# Patient Record
Sex: MALE | Race: WHITE | NOT HISPANIC OR LATINO | Employment: FULL TIME | URBAN - METROPOLITAN AREA
[De-identification: names, ages, dates, MRNs, and addresses within clinical notes are randomized per-mention and may not be internally consistent; named-entity substitution may affect disease eponyms.]

---

## 2017-06-09 DIAGNOSIS — K02.61 DENTAL CARIES ON SMOOTH SURFACE LIMITED TO ENAMEL: ICD-10-CM

## 2021-06-16 ENCOUNTER — OFFICE VISIT (OUTPATIENT)
Dept: DENTISTRY | Facility: CLINIC | Age: 31
End: 2021-06-16

## 2021-06-16 VITALS — SYSTOLIC BLOOD PRESSURE: 131 MMHG | TEMPERATURE: 96.9 F | DIASTOLIC BLOOD PRESSURE: 81 MMHG | HEART RATE: 60 BPM

## 2021-06-16 DIAGNOSIS — K02.61 DENTAL CARIES ON SMOOTH SURFACE LIMITED TO ENAMEL: Primary | ICD-10-CM

## 2021-06-16 PROCEDURE — D2392 RESIN-BASED COMPOSITE - 2 SURFACES, POSTERIOR: HCPCS | Performed by: DENTIST

## 2021-06-16 NOTE — PROGRESS NOTES
Dental procedures in this visit     - RESIN-BASED COMPOSITE - 2 SURFACES, POSTERIOR 28 DO (Completed)     Service provider: Servando Napier DDS     Billing provider: Mali Whitmore DMD      Pt presented to clinic for restorative  Reviewed med hx  Pt denies any changes  ASA I  Applied topical benzocaine 20%  Administered 0 5 carpules of 4% septocaine 1 7 ml with 1:100k epi to right mental nerve  Placed bite block and removed primary caries from DO of #28  Mesial of #29 did not appear cavitated, #29 needs a crown regardless  Placed thacker matrix, wedge, and ring  Used Viscostat for heme control  Placed etch/primer/adhesive  Filled with A2 flowable composite and packable  Checked contact, margins, and occlusion  Pt satisfied with treatment      NV: Continue restorative    Dr Rocío Sparks

## 2021-10-21 ENCOUNTER — OFFICE VISIT (OUTPATIENT)
Dept: DENTISTRY | Facility: CLINIC | Age: 31
End: 2021-10-21

## 2021-10-21 ENCOUNTER — OFFICE VISIT (OUTPATIENT)
Dept: FAMILY MEDICINE CLINIC | Facility: CLINIC | Age: 31
End: 2021-10-21

## 2021-10-21 VITALS
HEIGHT: 74 IN | RESPIRATION RATE: 16 BRPM | OXYGEN SATURATION: 99 % | SYSTOLIC BLOOD PRESSURE: 150 MMHG | DIASTOLIC BLOOD PRESSURE: 100 MMHG | BODY MASS INDEX: 21.17 KG/M2 | HEART RATE: 101 BPM | TEMPERATURE: 98 F | WEIGHT: 165 LBS

## 2021-10-21 VITALS — DIASTOLIC BLOOD PRESSURE: 96 MMHG | HEART RATE: 102 BPM | SYSTOLIC BLOOD PRESSURE: 147 MMHG | TEMPERATURE: 97.5 F

## 2021-10-21 DIAGNOSIS — M54.2 NECK PAIN: Primary | ICD-10-CM

## 2021-10-21 DIAGNOSIS — Z01.21 ENCOUNTER FOR DENTAL EXAMINATION AND CLEANING WITH ABNORMAL FINDINGS: Primary | ICD-10-CM

## 2021-10-21 PROCEDURE — D0191 ASSESSMENT OF A PATIENT: HCPCS | Performed by: DENTIST

## 2021-10-21 PROCEDURE — 99214 OFFICE O/P EST MOD 30 MIN: CPT | Performed by: FAMILY MEDICINE

## 2021-11-04 ENCOUNTER — OFFICE VISIT (OUTPATIENT)
Dept: DENTISTRY | Facility: CLINIC | Age: 31
End: 2021-11-04

## 2021-11-04 VITALS — SYSTOLIC BLOOD PRESSURE: 135 MMHG | HEART RATE: 80 BPM | TEMPERATURE: 97.3 F | DIASTOLIC BLOOD PRESSURE: 84 MMHG

## 2021-11-04 DIAGNOSIS — Z01.20 ENCOUNTER FOR DENTAL EXAMINATION: Primary | ICD-10-CM

## 2021-11-04 PROCEDURE — D1110 PROPHYLAXIS - ADULT: HCPCS | Performed by: DENTAL HYGIENIST

## 2021-11-04 PROCEDURE — D1330 ORAL HYGIENE INSTRUCTIONS: HCPCS | Performed by: DENTAL HYGIENIST

## 2021-11-04 PROCEDURE — D0274 BITEWINGS - 4 RADIOGRAPHIC IMAGES: HCPCS | Performed by: DENTAL HYGIENIST

## 2021-11-04 PROCEDURE — D0120 PERIODIC ORAL EVALUATION - ESTABLISHED PATIENT: HCPCS | Performed by: DENTIST

## 2021-11-11 ENCOUNTER — OFFICE VISIT (OUTPATIENT)
Dept: DENTISTRY | Facility: CLINIC | Age: 31
End: 2021-11-11

## 2021-11-11 VITALS — HEART RATE: 103 BPM | TEMPERATURE: 97.5 F | SYSTOLIC BLOOD PRESSURE: 142 MMHG | DIASTOLIC BLOOD PRESSURE: 94 MMHG

## 2021-11-11 DIAGNOSIS — K08.50 DEFECTIVE DENTAL RESTORATION: Primary | ICD-10-CM

## 2021-11-11 PROCEDURE — D2392 RESIN-BASED COMPOSITE - 2 SURFACES, POSTERIOR: HCPCS | Performed by: DENTIST

## 2021-11-23 ENCOUNTER — OFFICE VISIT (OUTPATIENT)
Dept: FAMILY MEDICINE CLINIC | Facility: CLINIC | Age: 31
End: 2021-11-23

## 2021-11-23 VITALS
DIASTOLIC BLOOD PRESSURE: 92 MMHG | HEIGHT: 74 IN | OXYGEN SATURATION: 96 % | HEART RATE: 99 BPM | WEIGHT: 207.3 LBS | BODY MASS INDEX: 26.6 KG/M2 | SYSTOLIC BLOOD PRESSURE: 138 MMHG | TEMPERATURE: 97.8 F | RESPIRATION RATE: 18 BRPM

## 2021-11-23 DIAGNOSIS — Z11.4 SCREENING FOR HIV (HUMAN IMMUNODEFICIENCY VIRUS): ICD-10-CM

## 2021-11-23 DIAGNOSIS — Z13.9 ENCOUNTER FOR HEALTH-RELATED SCREENING: ICD-10-CM

## 2021-11-23 DIAGNOSIS — Z11.59 NEED FOR HEPATITIS C SCREENING TEST: Primary | ICD-10-CM

## 2021-11-23 PROBLEM — F10.10 ALCOHOL ABUSE: Status: ACTIVE | Noted: 2021-11-23

## 2021-11-23 PROBLEM — F41.1 GAD (GENERALIZED ANXIETY DISORDER): Status: ACTIVE | Noted: 2021-11-23

## 2021-11-23 PROCEDURE — 99214 OFFICE O/P EST MOD 30 MIN: CPT | Performed by: FAMILY MEDICINE

## 2021-11-29 ENCOUNTER — APPOINTMENT (OUTPATIENT)
Dept: LAB | Facility: HOSPITAL | Age: 31
End: 2021-11-29

## 2021-12-21 ENCOUNTER — OFFICE VISIT (OUTPATIENT)
Dept: FAMILY MEDICINE CLINIC | Facility: CLINIC | Age: 31
End: 2021-12-21

## 2021-12-21 VITALS
TEMPERATURE: 97.6 F | RESPIRATION RATE: 18 BRPM | HEART RATE: 98 BPM | SYSTOLIC BLOOD PRESSURE: 134 MMHG | BODY MASS INDEX: 27.44 KG/M2 | DIASTOLIC BLOOD PRESSURE: 86 MMHG | HEIGHT: 74 IN | WEIGHT: 213.8 LBS | OXYGEN SATURATION: 99 %

## 2021-12-21 DIAGNOSIS — F10.10 ALCOHOL ABUSE: Primary | ICD-10-CM

## 2021-12-21 DIAGNOSIS — F41.1 GAD (GENERALIZED ANXIETY DISORDER): ICD-10-CM

## 2021-12-21 PROCEDURE — 99213 OFFICE O/P EST LOW 20 MIN: CPT | Performed by: INTERNAL MEDICINE

## 2022-03-15 ENCOUNTER — OFFICE VISIT (OUTPATIENT)
Dept: DENTISTRY | Facility: CLINIC | Age: 32
End: 2022-03-15

## 2022-03-15 VITALS — SYSTOLIC BLOOD PRESSURE: 134 MMHG | DIASTOLIC BLOOD PRESSURE: 84 MMHG | TEMPERATURE: 98 F

## 2022-03-15 DIAGNOSIS — Z98.890 PREVIOUSLY INITIATED ENDODONTIC THERAPY COMPLETED: Primary | ICD-10-CM

## 2022-03-15 PROCEDURE — D2954 PREFABRICATED POST AND CORE IN ADDITION TO CROWN: HCPCS | Performed by: DENTIST

## 2022-03-16 NOTE — PROGRESS NOTES
Pt presents to clinic for post/core #29  Medical history reviewed, no changes  Topical benzocaine applied, administered 1/2 carp of 4% articaine w/ 1:100K epi via local infiltration  Clamp placed on tooth with floss ligature and rubber dam isolation utilized  Removed old core material using a round carbide on HS, and canals located  Created post space using System B and para post drills up to the red drill  Tried in corresponding post, confirmed post went to length  Etched tooth and post space, rinsed/dried  Applied adhesive, gentle air dry, light cure  Placed Multicore into post space and on post, placed post in post space and light cured  Restored the tooth with Multicore  Refined with finishing burs, verified occlusion  Post Op PA radiograph taken  Pt left satisfied and ambulatory  Nv: [place] prep #29  Ww: Dr Candy Scott    Pt has severe gag reflex to anything touching his tongue  Talked about the need for crowns on teeth #4 and 5 due to previously endo treated #4 and large restoration #5, as well as the larg interproximal space between the teeth  Explained to pt it would be best to fabricate all crowns together, but he prefers to do one at a time

## 2022-11-25 ENCOUNTER — OFFICE VISIT (OUTPATIENT)
Dept: DENTISTRY | Facility: CLINIC | Age: 32
End: 2022-11-25

## 2022-11-25 VITALS — SYSTOLIC BLOOD PRESSURE: 129 MMHG | HEART RATE: 90 BPM | TEMPERATURE: 98.2 F | DIASTOLIC BLOOD PRESSURE: 84 MMHG

## 2022-11-25 DIAGNOSIS — Z01.21 ENCOUNTER FOR DENTAL EXAMINATION AND CLEANING WITH ABNORMAL FINDINGS: Primary | ICD-10-CM

## 2022-11-25 NOTE — PROGRESS NOTES
Periodic Exam    Barbara Alvarado presents for a perioditc exam  Verbal consent for treatment given in addition to the forms  Reviewed health history - Patient is ASA class 1  No medications taken  Consents signed: Yes    Perio: PPD range from 2-4  Isolated areas with 5  Gingivitis  Pain Scale: 0  Caries Assessment: High  Radiographs: 4 Bitewings    Patient has a severe gag reflex  Had patient swish with Listerine before impressions  Upper and lower alginate impressions were taken for diagnostic casts  Findings  5-D open margin, 14-D watch, 15-M caries, 17-MOB caries, 18-D caries, 21 incipient, 29 L- caries   Watch 13 M and D  18 and 17 are mesially shifted      Tx plan: Prophy every 6 months  Prevident prescribed for caries  Prevident prescription given to patient via paper form  Restorative Tx Plan in this order  #5DO (WIS CODE),  #29 [place] prep   #15 [place] prep and verify #14DO  #18 [place] and verify extent of #17 MOB   #4 crow    Pt is interested in restoring missing teeth with implants  Prognosis is Good  Referrals needed: No  Nv: Prophy  Nnv:  #5DO re-do    All crowns should be scheduled with Dr Yazmin Brantley

## 2022-11-25 NOTE — DENTAL PROCEDURE DETAILS
Periodic Exam    Kem Mcdaniel presents for a perioditc exam  Verbal consent for treatment given in addition to the forms  Reviewed health history - Patient is ASA class 1  No medications taken  Consents signed: Yes    Perio: PPD range from 2-4  Isolated areas with 5  Gingivitis  Pain Scale: 0  Caries Assessment: High  Radiographs: 4 Bitewings    Patient has a severe gag reflex  Had patient swish with Listerine before impressions  Upper and lower alginate impressions were taken for diagnostic casts  Findings  5-D open margin, 14-D watch, 15-M caries, 17-MOB caries, 18-D caries, 21 incipient, 29 L- caries   Watch 13 M and D  18 and 17 are mesially shifted      Tx plan: Prophy every 6 months  Prevident prescribed for caries  Prevident prescription given to patient via paper form  Restorative Tx Plan in this order  #5DO (WIS CODE),  #29 [place] prep   #15 [place] prep and verify #14DO  #18 [place] and verify extent of #17 MOB   #4 crow    Pt is interested in restoring missing teeth with implants  Prognosis is Good  Referrals needed: No  Nv: Prophy  Nnv:  #5DO re-do    All crowns should be scheduled with Dr Gloria Ruiz

## 2023-03-08 ENCOUNTER — OFFICE VISIT (OUTPATIENT)
Dept: DENTISTRY | Facility: CLINIC | Age: 33
End: 2023-03-08

## 2023-03-08 ENCOUNTER — OFFICE VISIT (OUTPATIENT)
Dept: FAMILY MEDICINE CLINIC | Facility: CLINIC | Age: 33
End: 2023-03-08

## 2023-03-08 VITALS — TEMPERATURE: 98.3 F | SYSTOLIC BLOOD PRESSURE: 136 MMHG | HEART RATE: 89 BPM | DIASTOLIC BLOOD PRESSURE: 94 MMHG

## 2023-03-08 VITALS
OXYGEN SATURATION: 98 % | WEIGHT: 221.7 LBS | TEMPERATURE: 98 F | BODY MASS INDEX: 28.45 KG/M2 | SYSTOLIC BLOOD PRESSURE: 126 MMHG | RESPIRATION RATE: 18 BRPM | DIASTOLIC BLOOD PRESSURE: 98 MMHG | HEIGHT: 74 IN | HEART RATE: 107 BPM

## 2023-03-08 DIAGNOSIS — R68.84 JAW PAIN: Primary | ICD-10-CM

## 2023-03-08 DIAGNOSIS — Z01.20 ENCOUNTER FOR DENTAL EXAMINATION: Primary | ICD-10-CM

## 2023-03-08 DIAGNOSIS — K09.0: Primary | ICD-10-CM

## 2023-03-08 RX ORDER — GABAPENTIN 300 MG/1
300 CAPSULE ORAL 3 TIMES DAILY
Qty: 60 CAPSULE | Refills: 3 | Status: SHIPPED | OUTPATIENT
Start: 2023-03-08

## 2023-03-08 NOTE — DENTAL PROCEDURE DETAILS
ASA II  Pain - 0  Reviewed M/DH    Prophylaxis completed with ultrasonic  and hand instrumentation  Soft plaque removed and supragingival calculus removed from all teeth  Polished with prophy cup and paste  Flossed and provided Oral Health Instructions  Demonstrated proper brushing and flossing technique  Patient left satisfied and ambulatory      Exam:  none  Referral:  none    NV1:  Restorative  NV2:  6mrc

## 2023-03-08 NOTE — PROGRESS NOTES
CC- Pt c/o R jaw pain radiating to his chest; happens at work or lying down sometimes per pt report  ASA II  NOTE: h/o tob use; states marijuana sometimes  Panorex: no PAP; no cysts noted; no abnoraml findings specifically RLQ  NO TX TODAY DUE TO CC;pt in no acute distress and no difficulty breathing at this appt but will defer tx today  Exam: no soft tissue swelling not intraorally; teeth removed LRQ and appear to have completely healed LRQ; #29 previous rct/crown no PAP  No tenderness noted right side of face extraorally/intraorally  Referral to PCP today ; last visit approx I yr  Discussed with pt referral and why to r/o any cardiac issues  Pt is understanding and will go to make appt at [facility]    NV: #5 DO

## 2023-03-08 NOTE — LETTER
March 10, 2023     MD Radha [address]  [id number]    Patient: Valeriano Whittington   YOB: 1990   Date of Visit: 3/8/2023       Dear Dr Maulik Burton:    Thank you for referring Valeriano Whittington to me for evaluation  Below are my notes for this consultation  If you have questions, please do not hesitate to call me  I look forward to following your patient along with you  Sincerely,        Severino Jerome DO        CC: No Recipients  Severino Jerome DO  3/10/2023 10:11 AM  Incomplete  Assessment/Plan:    1  Jaw pain  Assessment & Plan:  Likely neuropathic in etiology  Less likely secondary to any cardiac etiology  Cardiac exam unremarkable  Currently denies any chest pain  No work-up for cardio recommended at this time  Will recommend patient continues trial of gabapentin when these episodes do occur  Encourage patient to keep journal of events and follow-up in a few months to go over her symptoms and resolution of symptoms with gabapentin as needed  Orders:  -     gabapentin (Neurontin) 300 mg capsule; Take 1 capsule (300 mg total) by mouth 3 (three) times a day       Subjective:     Patient ID: Valeriano Whittington is a 28 y o  male  History notable for general anxiety disorder, alcohol abuse is coming in today after visit to the dentist where he reported ongoing jaw pain that is been going on for several months  Dentist was concerned of this jaw pain and wanted to refer to PCP to evaluate for cardiac etiology  Patient reports that his pain is usually around his jaw and has mild radiation down his jaw, however he denies any chest pain, palpitation or any shortness of breath  Patient denies any trauma to the jaw  He states that these episodes last for 30 minutes and subsequently go away  Patient denies any family history of any heart disease  Patient denies any history of any cardiovascular disease        The following portions of the patient's history were reviewed and updated as appropriate: allergies, current medications, past family history, past medical history, past social history, past surgical history, and problem list     Review of Systems   Constitutional: Negative for chills and fever  HENT: Negative for ear pain and sore throat  Eyes: Negative for pain and visual disturbance  Respiratory: Negative for cough and shortness of breath  Cardiovascular: Negative for chest pain and palpitations  Gastrointestinal: Negative for abdominal pain and vomiting  Genitourinary: Negative for dysuria and hematuria  Musculoskeletal: Negative for arthralgias and back pain  Skin: Negative for color change and rash  Neurological: Negative for seizures and syncope  All other systems reviewed and are negative  Objective:      /98 (BP Location: Left arm, Patient Position: Sitting, Cuff Size: Standard)   Pulse (!) 107   Temp 98 °F (36 7 °C) (Temporal)   Resp 18   Ht 6' 2" (1 88 m)   Wt 101 kg (221 lb 11 2 oz)   SpO2 98%   BMI 28 46 kg/m²         Physical Exam  Constitutional:       General: He is not in acute distress  Appearance: Normal appearance  He is not toxic-appearing or diaphoretic  HENT:      Head: Normocephalic  Mouth/Throat:      Mouth: Mucous membranes are moist    Eyes:      Extraocular Movements: Extraocular movements intact  Pupils: Pupils are equal, round, and reactive to light  Cardiovascular:      Rate and Rhythm: Normal rate and regular rhythm  Pulmonary:      Effort: Pulmonary effort is normal  No tachypnea  Breath sounds: Normal breath sounds  No wheezing or rales  Chest:      Chest wall: No edema  There is no dullness to percussion  Abdominal:      General: Abdomen is flat  There is no distension  Palpations: Abdomen is soft  Tenderness: There is no abdominal tenderness  Musculoskeletal:      Right lower leg: No edema  Left lower leg: No edema     Skin: Capillary Refill: Capillary refill takes less than 2 seconds  Neurological:      General: No focal deficit present  Mental Status: He is alert and oriented to person, place, and time     Psychiatric:         Mood and Affect: Mood normal          Behavior: Behavior normal           Marysol Goncalves DO   Family Medicine PGY-2  3/10/2023

## 2023-03-08 NOTE — DENTAL PROCEDURE DETAILS
ASA  I  Pain- 0  Reviewed M/DH    Prophylaxis completed with ultrasonic  and hand instrumentation  Soft plaque removed and supragingival calculus removed from all teeth  Polished with prophy cup and paste  Flossed and provided Oral Health Instructions  Demonstrated proper brushing and flossing technique  Patient left satisfied and ambulatory  Exam:  none  Referral:  none    NV1:  Redo rest #5  NV2:  [place] prep #14?   NV3:  6mrc

## 2023-03-10 PROBLEM — R68.84 JAW PAIN: Status: ACTIVE | Noted: 2023-03-10

## 2023-03-10 NOTE — PROGRESS NOTES
Assessment/Plan:    1  Jaw pain  Assessment & Plan:  Likely neuropathic in etiology  Less likely secondary to any cardiac etiology  Cardiac exam unremarkable  Currently denies any chest pain  No work-up for cardio recommended at this time  Will recommend patient continues trial of gabapentin when these episodes do occur  Encourage patient to keep journal of events and follow-up in a few months to go over her symptoms and resolution of symptoms with gabapentin as needed  Orders:  -     gabapentin (Neurontin) 300 mg capsule; Take 1 capsule (300 mg total) by mouth 3 (three) times a day       Subjective:      Patient ID: Zoë Alas is a 28 y o  male  History notable for general anxiety disorder, alcohol abuse is coming in today after visit to the dentist where he reported ongoing jaw pain that is been going on for several months  Dentist was concerned of this jaw pain and wanted to refer to PCP to evaluate for cardiac etiology  Patient reports that his pain is usually around his jaw and has mild radiation down his jaw, however he denies any chest pain, palpitation or any shortness of breath  Patient denies any trauma to the jaw  He states that these episodes last for 30 minutes and subsequently go away  Patient denies any family history of any heart disease  Patient denies any history of any cardiovascular disease  The following portions of the patient's history were reviewed and updated as appropriate: allergies, current medications, past family history, past medical history, past social history, past surgical history, and problem list     Review of Systems   Constitutional: Negative for chills and fever  HENT: Negative for ear pain and sore throat  Eyes: Negative for pain and visual disturbance  Respiratory: Negative for cough and shortness of breath  Cardiovascular: Negative for chest pain and palpitations  Gastrointestinal: Negative for abdominal pain and vomiting  Genitourinary: Negative for dysuria and hematuria  Musculoskeletal: Negative for arthralgias and back pain  Skin: Negative for color change and rash  Neurological: Negative for seizures and syncope  All other systems reviewed and are negative  Objective:      /98 (BP Location: Left arm, Patient Position: Sitting, Cuff Size: Standard)   Pulse (!) 107   Temp 98 °F (36 7 °C) (Temporal)   Resp 18   Ht 6' 2" (1 88 m)   Wt 101 kg (221 lb 11 2 oz)   SpO2 98%   BMI 28 46 kg/m²          Physical Exam  Constitutional:       General: He is not in acute distress  Appearance: Normal appearance  He is not toxic-appearing or diaphoretic  HENT:      Head: Normocephalic  Mouth/Throat:      Mouth: Mucous membranes are moist    Eyes:      Extraocular Movements: Extraocular movements intact  Pupils: Pupils are equal, round, and reactive to light  Cardiovascular:      Rate and Rhythm: Normal rate and regular rhythm  Pulmonary:      Effort: Pulmonary effort is normal  No tachypnea  Breath sounds: Normal breath sounds  No wheezing or rales  Chest:      Chest wall: No edema  There is no dullness to percussion  Abdominal:      General: Abdomen is flat  There is no distension  Palpations: Abdomen is soft  Tenderness: There is no abdominal tenderness  Musculoskeletal:      Right lower leg: No edema  Left lower leg: No edema  Skin:     Capillary Refill: Capillary refill takes less than 2 seconds  Neurological:      General: No focal deficit present  Mental Status: He is alert and oriented to person, place, and time     Psychiatric:         Mood and Affect: Mood normal          Behavior: Behavior normal            Lars Schafer DO   Family Medicine PGY-2  3/10/2023

## 2023-03-10 NOTE — ASSESSMENT & PLAN NOTE
Likely neuropathic in etiology  Less likely secondary to any cardiac etiology  Cardiac exam unremarkable  Currently denies any chest pain  No work-up for cardio recommended at this time  Will recommend patient continues trial of gabapentin when these episodes do occur  Encourage patient to keep journal of events and follow-up in a few months to go over her symptoms and resolution of symptoms with gabapentin as needed

## 2023-04-04 ENCOUNTER — OFFICE VISIT (OUTPATIENT)
Dept: DENTISTRY | Facility: CLINIC | Age: 33
End: 2023-04-04

## 2023-04-04 VITALS — DIASTOLIC BLOOD PRESSURE: 80 MMHG | HEART RATE: 89 BPM | TEMPERATURE: 98.2 F | SYSTOLIC BLOOD PRESSURE: 119 MMHG

## 2023-04-04 DIAGNOSIS — K08.59 FULL RESTORATION OF CROWN OF TOOTH NEEDED DUE TO PREVIOUS ENDODONTIC TREATMENT: Primary | ICD-10-CM

## 2023-04-04 NOTE — PROGRESS NOTES
[place] Prep: 29 PFZ     Sabrina Callahan presents for crown prep #29  PMH reviewed, no changes  ASA Type 1  Pt states he does not take any medications including gabapentin  Discussed doing a lower partial to replace missing teeth  Pt does not want anything removable  Pt also has strong gag refex  Discussed mandibular implants and patient was on board  Discussed case with Dr Heriberto Pascal and he said one implant can be place at edentulous site #30  Fabricated bite matrix with PVS on dx  Cast     Shade C3  Approved with pt mirror  Applied topical benzocaine, administered 0 25 carpule of 4% articaine 1:100k epi via local infiltration of 29  Tooth prepped with reductions for #29 PFZ  Made provisional crown with provisa and matrix, refined with lizandro on high speed  Confirmed provisional covers margins with no overhangs  Packed size 00 cord soaked in hemodent  Packed size 0 second cord  Removed second cord, air dried  Impression made with Delkit light and heavy body using triple tray  Impression removed after 5 min, confirmed preparation captured with no voids or distortions  Cemented provisional crown using Provicell  Removed excess cement, occlusion and contacts verified  Selected porcelain shade C3, pt confirmed with mirror  Pt left satisfied and ambulatory  ? Re-discuss implant idea for #30  Dr Heriberto Pascal looked at case and verified there is enough bone for one implant  He said no CBCT is needed for this case      NV: delivery of PFZ crown #29  Nnv: #30 implant placement (discuss with pt first)

## 2023-05-08 ENCOUNTER — OFFICE VISIT (OUTPATIENT)
Dept: DENTISTRY | Facility: CLINIC | Age: 33
End: 2023-05-08

## 2023-05-08 VITALS — SYSTOLIC BLOOD PRESSURE: 149 MMHG | HEART RATE: 90 BPM | DIASTOLIC BLOOD PRESSURE: 97 MMHG

## 2023-05-08 DIAGNOSIS — Z78.9 FULL COVERAGE CROWN NEEDED FOR ROOT CANAL-TREATED TOOTH: Primary | ICD-10-CM

## 2023-05-08 NOTE — PROGRESS NOTES
[place] Delivery #29 PFZ    Mau Fall presents for delivery of #29 PFZ crown delivery  PMH reviewed, no changes  Pt reports no pain or sensitivity from tooth since last visit  ASA 1, pain 0  Pt opted for no anesthesia during procedure  Provisional crown removed, cement removed from prep with   Cleaned prep with prophy cup and pumice on slow speed  [place] tried on die, confirmed accurate seating, margin will adapted and sealed  [place] tried intraorally, crown contacts were tight, adjusted it with high speed hand piece  Crown seating fully with floss snapping through contacts  Verified seating with radiograph  Occlusion verified and no adjustments were necessary  Pt confirmed satisfaction with shade via pt mirror  Cleaned internal of crown with ivoclean  Rinse of prep and air dry, isolated with cotton rolls  Cemented with Calibra, tack cured, excess cement removed with high speed suction  After 3 min, floss through contacts to remove interproximal cement  Remaining cement removal after 5 minute final set  Occlusion and contacts verified  Pt comfortable with bite, left satisfied and ambulatory  Patient has extremely strong gag refex! It was very difficult getting an impression for #29  Pt has large restoration on #18 that is intact  There are sharp edges on lingual surface  Best treatment option for #18 is crown but it this will be extremely difficult due to strong gag reflex  I explained this to the patient  If scanner is working by next year, this would be a good case to scan to avoid taking impression  Discussed implant for #30  Pt is still interested in implant but wants to do it at the very end of his treatment  Clinically evaluated #5 distal and did not detect clinical caries  Re-evaluate at next visit  Nv: #18 [place] prep or consider doing direct restoration             NV:  Recall

## 2023-09-05 ENCOUNTER — OCCMED (OUTPATIENT)
Dept: URGENT CARE | Facility: CLINIC | Age: 33
End: 2023-09-05
Payer: OTHER MISCELLANEOUS

## 2023-09-05 ENCOUNTER — APPOINTMENT (OUTPATIENT)
Dept: RADIOLOGY | Facility: CLINIC | Age: 33
End: 2023-09-05
Payer: OTHER MISCELLANEOUS

## 2023-09-05 DIAGNOSIS — S39.012A STRAIN OF LUMBAR REGION, INITIAL ENCOUNTER: ICD-10-CM

## 2023-09-05 DIAGNOSIS — S39.012A STRAIN OF LUMBAR REGION, INITIAL ENCOUNTER: Primary | ICD-10-CM

## 2023-09-05 PROCEDURE — 99283 EMERGENCY DEPT VISIT LOW MDM: CPT | Performed by: PHYSICIAN ASSISTANT

## 2023-09-05 PROCEDURE — 72100 X-RAY EXAM L-S SPINE 2/3 VWS: CPT

## 2023-09-05 PROCEDURE — G0382 LEV 3 HOSP TYPE B ED VISIT: HCPCS | Performed by: PHYSICIAN ASSISTANT

## 2023-09-08 ENCOUNTER — OFFICE VISIT (OUTPATIENT)
Dept: DENTISTRY | Facility: CLINIC | Age: 33
End: 2023-09-08

## 2023-09-08 VITALS — DIASTOLIC BLOOD PRESSURE: 81 MMHG | SYSTOLIC BLOOD PRESSURE: 125 MMHG | TEMPERATURE: 98.2 F | HEART RATE: 85 BPM

## 2023-09-08 DIAGNOSIS — Z01.20 ENCOUNTER FOR DENTAL EXAMINATION: Primary | ICD-10-CM

## 2023-09-08 PROCEDURE — D1110 PROPHYLAXIS - ADULT: HCPCS | Performed by: DENTAL HYGIENIST

## 2023-09-08 PROCEDURE — D0120 PERIODIC ORAL EVALUATION - ESTABLISHED PATIENT: HCPCS

## 2023-09-08 NOTE — DENTAL PROCEDURE DETAILS
Becka Guzman presents for a Periodic exam. Verbal consent for treatment given in addition to the forms. Reviewed health history - Patient is ASA II  Consents signed: Yes     Perio: Generalized, Slight bleeding, Moderate bleeding, and Gingivitis  Pain Scale: 0  Caries Assessment: Medium  Radiographs: None  EO/IO/OCS:  WNL     Oral Hygiene instruction reviewed and given. OHI:  Poor  ---Mod plaque, lt to mod calc  ---Cavitron, handscaled, polish, floss  Recommended Hygiene recall visits with Rodrigue Christine. Treatment Plan:  1.  6mrc w/ BWs   2. Caries control: 17 - MO,B(V)  ---Core and crowns needed on #4, #14, and #18.    ---Pt wants to start with #18 core / crown first before restoration on #17.  ---Watch 5 - D, 21 - D  3. Occlusal evaluation:   Missing teeth  4. Case Difficulty Type 1    Prognosis is Good.   Referrals needed: No  Exam:  Dr. David Marquez    NV1:  [place] prep #18 - 75 min  ---please quote price  NV2:  6mrc w/ Bws - 60 min

## 2023-09-09 ENCOUNTER — APPOINTMENT (OUTPATIENT)
Dept: URGENT CARE | Facility: CLINIC | Age: 33
End: 2023-09-09
Payer: OTHER MISCELLANEOUS

## 2023-09-09 PROCEDURE — 99213 OFFICE O/P EST LOW 20 MIN: CPT

## 2023-10-04 ENCOUNTER — OCCMED (OUTPATIENT)
Dept: URGENT CARE | Facility: CLINIC | Age: 33
End: 2023-10-04
Payer: OTHER MISCELLANEOUS

## 2023-10-04 DIAGNOSIS — M54.40 BILATERAL LOW BACK PAIN WITH SCIATICA, SCIATICA LATERALITY UNSPECIFIED, UNSPECIFIED CHRONICITY: Primary | ICD-10-CM

## 2023-10-04 PROCEDURE — 99213 OFFICE O/P EST LOW 20 MIN: CPT | Performed by: PHYSICIAN ASSISTANT

## 2023-10-04 NOTE — PROGRESS NOTES
This encounter was created for [facility] orders only . [facility]        NAME: Fernando Santiago is a 28 y.o. male  : 1990    MRN: [id number]  DATE: 2023  TIME: 4:21 PM    There were no vitals taken for this visit. Assessment and Plan   No primary diagnosis found. No diagnosis found. Patient Instructions       Follow up with PCP in 3-5 days. Proceed to  ER if symptoms worsen. Chief Complaint   No chief complaint on file. History of Present Illness       HPI    Review of Systems   Review of Systems      Current Medications       Current Outpatient Medications:   •  gabapentin (Neurontin) 300 mg capsule, Take 1 capsule (300 mg total) by mouth 3 (three) times a day (Patient not taking: Reported on 2023), Disp: 60 capsule, Rfl: 3    Current Allergies     Allergies as of 10/04/2023   • (No Known Allergies)            The following portions of the patient's history were reviewed and updated as appropriate: allergies, current medications, past family history, past medical history, past social history, past surgical history and problem list.     No past medical history on file. No past surgical history on file. Family History   Problem Relation Age of Onset   • Heart disease Father          Medications have been verified. Objective   There were no vitals taken for this visit.        Physical Exam     Physical Exam

## 2023-10-19 ENCOUNTER — APPOINTMENT (OUTPATIENT)
Dept: URGENT CARE | Facility: CLINIC | Age: 33
End: 2023-10-19
Payer: OTHER MISCELLANEOUS

## 2023-10-19 PROCEDURE — 99213 OFFICE O/P EST LOW 20 MIN: CPT | Performed by: NURSE PRACTITIONER

## 2023-10-27 ENCOUNTER — APPOINTMENT (OUTPATIENT)
Dept: URGENT CARE | Facility: CLINIC | Age: 33
End: 2023-10-27
Payer: OTHER MISCELLANEOUS

## 2023-10-27 PROCEDURE — 99213 OFFICE O/P EST LOW 20 MIN: CPT

## 2024-02-27 ENCOUNTER — OFFICE VISIT (OUTPATIENT)
Dept: DENTISTRY | Facility: CLINIC | Age: 34
End: 2024-02-27

## 2024-02-27 VITALS — TEMPERATURE: 97.8 F | DIASTOLIC BLOOD PRESSURE: 98 MMHG | SYSTOLIC BLOOD PRESSURE: 138 MMHG | HEART RATE: 90 BPM

## 2024-02-27 DIAGNOSIS — K08.50 DEFECTIVE DENTAL RESTORATION: Primary | ICD-10-CM

## 2024-02-27 PROCEDURE — D0220 INTRAORAL - PERIAPICAL FIRST RADIOGRAPHIC IMAGE: HCPCS

## 2024-02-27 PROCEDURE — D0140 LIMITED ORAL EVALUATION - PROBLEM FOCUSED: HCPCS

## 2024-02-27 PROCEDURE — D0230 INTRAORAL - PERIAPICAL EACH ADDITIONAL RADIOGRAPHIC IMAGE: HCPCS

## 2024-02-27 NOTE — PROGRESS NOTES
Ricky Fournier presented for limited evaluation  CC: My tooth on the upper left in the back broke off recently. It does not hurt when I bite or is sensitive to temperature. My tooth on the upper right is uncomfortable when I chew and there is a pimple on the gums next to it. It does not hurt  Pain: 0/10  Med Hx: updated and reviewed. 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 PDL around #5 is not present, no PARL. #14 broken restoration  Tx Details: Clinical findings correlate with radiographic findings. #14DOL and #15MO defective restorations. Pt has no sx on these teeth. Informed pt that we can temporary restore #14DOL and #15MO today until he can return for #14 crown and #15MO composite ana. Pt understood and agreed.     Placed tofflemire matrix on #14. Isolation with cotton rolls and dri-angles. Applied GI to temporary restore #14DOL and #15MO. Refined with finishing burs, polished with enhance point. Verified occlusion and contacts.     #5 positive to percussion and no response to cold test. Ddx: chronic apical abscess and necrotic tooth. PAXR shows no PDL around #5. 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 Informed pt that #5 will need RCT/PostCore/[place]. Pt understood. Pt left satisfied.    NV1: #15MO  NV2: #14 crown prep  NV3: #5 RCT

## 2024-03-07 ENCOUNTER — OFFICE VISIT (OUTPATIENT)
Dept: DENTISTRY | Facility: CLINIC | Age: 34
End: 2024-03-07

## 2024-03-07 VITALS — TEMPERATURE: 99 F | HEART RATE: 89 BPM | SYSTOLIC BLOOD PRESSURE: 122 MMHG | DIASTOLIC BLOOD PRESSURE: 80 MMHG

## 2024-03-07 DIAGNOSIS — K02.9 DENTAL CARIES: Primary | ICD-10-CM

## 2024-03-07 PROCEDURE — D2393 RESIN-BASED COMPOSITE - 3 SURFACES, POSTERIOR: HCPCS

## 2024-03-08 NOTE — DENTAL PROCEDURE DETAILS
"Pt presents for a dental restoration and verbally consents for treatment:  CC-\"I am here to get my fillings done\"  Reviewed health history-  Pt is ASA type 2  Treatment consents signed: Yes   Perio: Gingivitis   Pain Scale: 0   Caries Assessment: High  Radiographs: Films are current    Pt initially presented for #15 MO resin. Tooth was temporarily restored with GI capping 2 cusps. Informed pt that a resin will not be sufficient to restore tooth and full coverage crown indicated. Examined remaining teeth and altered treatment plan:    Revised tx plan  #4 -needs full coverage crown after endo  #5- necrotic pulp, chronic apical abscess - needs RCT, post and core  #14 needs crown due to lost tooth structure  #15 needs crown due to lost tooth structure  #17- unable to restore with resin/crown, will restore with GI as permanent restoration  #18 needs crown due to lost tooth structure    Pt informed of risks, benefits, alternatives and agreed to tx plan, informed pt that we can treat active caries on tooth #17 with GI but tooth has a poor prognosis due to limited coronal tooth structure and subgingival caries. Pt understood    Pt  agree with the diagnosis of caries and the  proposed treatment plan for the GI restoration:  Tooth #17 MOB  Dental Anesthesia:  1.5 carpules 2% lidocaine w/1:100k epi given KATHERINE and infiltration  Prepared ideal class 2 prep with occlusal dovetail on tooth #17 MO. Used slow speed round bur to excavate decay on buccal surface going subgingivally. Refined for retention and resistance form.   Tofflemire matrix and wedge placed.  Material:  Ionostart GI placed and packed and refined with plastic instrument  Checked occlusion and contact and refined with finishing bur    Prognosis is Poor  Referrals Needed: No  Next visit: RCT #5  "

## 2024-03-19 ENCOUNTER — OFFICE VISIT (OUTPATIENT)
Dept: DENTISTRY | Facility: CLINIC | Age: 34
End: 2024-03-19

## 2024-03-19 VITALS — DIASTOLIC BLOOD PRESSURE: 79 MMHG | SYSTOLIC BLOOD PRESSURE: 115 MMHG | TEMPERATURE: 98 F | HEART RATE: 80 BPM

## 2024-03-19 DIAGNOSIS — K04.7 PERIAPICAL ABSCESS WITHOUT SINUS: Primary | ICD-10-CM

## 2024-03-19 PROCEDURE — D3320 ENDODONTIC THERAPY, PREMOLAR TOOTH (EXCLUDING FINAL RESTORATION): HCPCS

## 2024-03-19 NOTE — DENTAL PROCEDURE DETAILS
RCT #5 Single Visit    Ricky Fournier presents for RCT #5. PMH reviewed, no changes.     Tooth #5 dx - pulp necrosis, asymptomatic apical periodontitis    Tx plan reviewed and endodontic consent obtained.    Applied topical benzocaine, administered 1 carps 4% articaine 1:100k epi via infiltration.  Clamp and rubber dam placed.  Pulp chamber accessed with round carbide.   Identified 2 canals  Working length determined with apex locater - B=19 mm. L=18 mm.  Hand file up to size 15.  [employer] instrumentation with NaOCL irrigation up to size Primary  Conformed single master cones tried in and checked with radiograph  Dried canal with paper points  Placed Bioceramic sealer into coronal third of orifices.  Obturated canals with conformed single master cones and seared at orifice level.  Placed cotton pellet, restored with Cavit and occlusion verified.  Obtained PA radiograph. Obturation is dense with no porosities and filled to apex, minor sealer extrusion present.  Pt left ambulatory and satisfied.     Dr. Ward attending    NV: Post/core #5, 4 weeks or sooner

## 2024-04-09 ENCOUNTER — OFFICE VISIT (OUTPATIENT)
Dept: DENTISTRY | Facility: CLINIC | Age: 34
End: 2024-04-09

## 2024-04-09 DIAGNOSIS — K03.6 ACCRETIONS ON TEETH: ICD-10-CM

## 2024-04-09 DIAGNOSIS — Z01.20 ENCOUNTER FOR DENTAL EXAMINATION: Primary | ICD-10-CM

## 2024-04-09 DIAGNOSIS — K03.6 DENTAL CALCULUS: ICD-10-CM

## 2024-04-09 PROCEDURE — D0274 BITEWINGS - 4 RADIOGRAPHIC IMAGES: HCPCS

## 2024-04-09 PROCEDURE — D0120 PERIODIC ORAL EVALUATION - ESTABLISHED PATIENT: HCPCS | Performed by: DENTIST

## 2024-04-09 PROCEDURE — D1110 PROPHYLAXIS - ADULT: HCPCS

## 2024-04-09 NOTE — DENTAL PROCEDURE DETAILS
Ricky Fournier presents for a Periodic exam. Verbal consent for treatment given in addition to the forms.     Reviewed health history - Patient is ASA II  Consents signed: Yes  BP and med HX not reviewed due to patient's  agitated presentation  ( see note below)     Perio: Normal  stage 1 grade A  Caries risk high     Caries Assessment: High  Radiographs: Bitewings x4  pax # 5 endo treated tooth N/C      Recommended Hygiene recall visits with the Ricky.     Treatment Plan:  1.  Infection control: referred for CONSULTATION AT [facility] RE ENDO # 5      DR COLLIER SUGG HE MAY NEED CBCT SCAN     tooth will need to be evaluated for final restoration    2.  Periodontal therapy: adult prophy   3.  Caries control: as charted        Prognosis is Good.  Referrals needed: [place]/ DR SAAVEDRA      Patient arrived thinking this was an appointment for his crown # 5  He was under the impression this was the last visit for that tooth   He became VERY UPSET AND AGITATED WHEN I EXPLAINED IT WAS HIS RECALL VISIT   He stated he would lose his job ( he has missed too many days)   He was crying and visibly upset     Init. He walked out but then returned saying he will stay for the pro since he was here anyway.  BWX and PA # 5 taken    DR COLLIER performed exam and sugg he go to [facility] to have tooth #5 evaluated prior to final restoration.  ( Endo completed 3/2024)  Next Visit: cons at [facility]    6 mos recall    Proceed with CR # 5 if indicated

## 2024-04-09 NOTE — DENTAL PROCEDURE DETAILS
Prophylaxis completed with ultrasonic  and hand instrumentation.  Soft plaque removed and supragingival calculus removed   Polished with prophy cup and paste.  Flossed and provided Oral Health Instructions.  Demonstrated proper brushing and flossing technique.  Patient left satisfied and ambulatory.  4 BWX taken and 1 pax # 5 N/C  See  exam note    6 MRC

## 2024-04-10 ENCOUNTER — TELEPHONE (OUTPATIENT)
Dept: DENTISTRY | Facility: CLINIC | Age: 34
End: 2024-04-10

## 2024-04-10 NOTE — TELEPHONE ENCOUNTER
As per  I tried contacting patient to duy the CBCT on a day Dr. Ward is attending . Patient did not answer but I left a VM to please reach back out to us .

## 2024-04-17 ENCOUNTER — TELEPHONE (OUTPATIENT)
Dept: DENTISTRY | Facility: CLINIC | Age: 34
End: 2024-04-17

## 2024-05-07 ENCOUNTER — OFFICE VISIT (OUTPATIENT)
Dept: DENTISTRY | Facility: CLINIC | Age: 34
End: 2024-05-07

## 2024-05-07 VITALS — HEART RATE: 63 BPM | DIASTOLIC BLOOD PRESSURE: 86 MMHG | SYSTOLIC BLOOD PRESSURE: 123 MMHG | TEMPERATURE: 97.7 F

## 2024-05-07 DIAGNOSIS — K08.59 FULL RESTORATION OF CROWN OF TOOTH NEEDED DUE TO PREVIOUS ENDODONTIC TREATMENT: Primary | ICD-10-CM

## 2024-05-07 PROCEDURE — D2954 PREFABRICATED POST AND CORE IN ADDITION TO CROWN: HCPCS

## 2024-05-07 NOTE — DENTAL PROCEDURE DETAILS
Post and Core #5    Ricky Fournier 34 y.o. male presents with self to [facility] for Evaluation of tooth #5 and Post and Core #5  PMH reviewed, no changes, ASA II.   Pain level 0/10    Diagnosis:  Previously completed root canal therapy. Patient is free of symptoms and tooth apex consistent with normal healing. Patient reports no symptoms since having RCT #5 completed and says the tooth feels fine. New PA Radiograph taken. New trabecular bone formation around the sealer puff is evident consistent with healing.  Post indicated for retention of core.  Core indicated for restoration of lost tooth structure needed prior to crown prep.    Consent:  Tx plan for reviewed including post/core #5 and crowns #4 and 5.  Patient understands and consents to post/core #5 today. Patient to return for treatment of these teeth at [facility].    Procedure details:  No anesthesia utilized.  #5 presents with Cavit over access cavity  Isolation with high volume suction and cotton rolls. Access was through occlusal only therefore no contact with gingival margin was present.and excellent isolation was achievable without rubber dam.  Temporary restorative material removed and Josafat percha accessed with round carbide.   Post space prepared using [employer] Glass Fiber Post System up to size L1 (Yellow)  Etch 27% H2PO4 20 seconds. Rinsed and suctioned.  Applied Ivoclar Multicore Flow adhesive with 20 second scrub once, gentle air dry and light cured for 10s.  Applied Ivoclar Multicore Flow core buildup material to canals.   Glass fiber post placed into canal and build-up placed around core.   Post-op PA radiograph taken  Checked and adjusted occlusion.    Patient left ambulatory and alert.     NV: Core buildup #4, crown preps #4, 5 Dr. Louis 120 min    Attending Dr. Ward

## 2024-06-06 ENCOUNTER — OFFICE VISIT (OUTPATIENT)
Dept: DENTISTRY | Facility: CLINIC | Age: 34
End: 2024-06-06

## 2024-06-06 VITALS — HEART RATE: 77 BPM | SYSTOLIC BLOOD PRESSURE: 120 MMHG | DIASTOLIC BLOOD PRESSURE: 76 MMHG

## 2024-06-06 DIAGNOSIS — Z78.9 FULL COVERAGE CROWN NEEDED FOR ROOT CANAL-TREATED TOOTH: Primary | ICD-10-CM

## 2024-06-06 PROCEDURE — WIS2001 FINAL IMPRESSION - CROWN OR IMPLANT

## 2024-06-06 PROCEDURE — D2954 PREFABRICATED POST AND CORE IN ADDITION TO CROWN: HCPCS

## 2024-06-06 PROCEDURE — WIS2000 CROWN PREP

## 2024-06-06 NOTE — DENTAL PROCEDURE DETAILS
Post and Core #4  Crown prep, temp, final impression #4    Ricky Fournier 34 y.o. male presents with self to [facility] for Post and Core #4 and crown prep.  PMH reviewed, no changes, ASA II.   Pain level 0/10    Diagnosis:  Previously completed root canal therapy. Patient is free of symptoms and tooth apex consistent with normal healing.  Post indicated for retention of core.  Core indicated for restoration of lost tooth structure needed prior to crown prep.  Endo-treated tooth in need of full coverage crown    Prognosis:  questionable due to apical depth of mesial margin being 3-4 mm subgingival and possibly in biologic width violation. Patient informed of this possible issue and consents to treatment.      Anesthesia:  Topical 20% benzocaine.  1 carps 4% Septocaine 1:100k epi via infiltration.    Procedure details:  #4 presents with Cavit over access cavity   Clamp and rubber dam placed.   Temporary restorative material removed and Josafat percha accessed with round carbide.   Post space prepared using [employer] Glass Fiber Post System up to size L1 (Yellow)  Corresponding glass fiber post tried in canal  PA radiograph to verify seating taken  Etch 27% H2PO4 20 seconds. Rinsed and suctioned.  Applied Ivoclar Multicore Flow adhesive with 20 second scrub once, gentle air dry and light cured for 10s.  Applied Ivoclar Multicore Flow core buildup material to canals.   Glass fiber post placed into canal and build-up placed around core.   Post-op PA radiograph taken  Preliminary stent taken with matrix putty in triple tray  Tooth #4 prepped with reductions for Zirconia. As expected, mesial margin was ~4 mm subgingival. Was able to reach sound tooth structure margin but was margin was apical to CEJ. Bleeding was controlled with Quikstat and cotton pellets.   Made provisional crown with provisa and matrix, refined with lizandro on high speed.  Confirmed provisional covers margins with no overhangs.   Packed two size 1 cords soaked  in hemodent.  Removed cord, air dry. Final Impression made with Delkit light and heavy body using triple tray. Impression removed after 5 min, confirmed preparation captured with no voids or distortions.  Cemented provisional crown using Provicell. Removed excess cement, occlusion and contacts verified.   Selected porcelain shade A2, pt confirmed with mirror.   Sent to [facility]  Patient dismissed ambulatory and alert    Attending Dr. Diaz    NV: [place] delivery #4. [place] prep #5.

## 2024-06-17 ENCOUNTER — OFFICE VISIT (OUTPATIENT)
Dept: DENTISTRY | Facility: CLINIC | Age: 34
End: 2024-06-17

## 2024-06-17 VITALS — HEART RATE: 75 BPM | DIASTOLIC BLOOD PRESSURE: 89 MMHG | TEMPERATURE: 97.3 F | SYSTOLIC BLOOD PRESSURE: 129 MMHG

## 2024-06-17 DIAGNOSIS — Z78.9 FULL COVERAGE CROWN NEEDED FOR ROOT CANAL-TREATED TOOTH: Primary | ICD-10-CM

## 2024-06-17 PROCEDURE — D2740 CROWN - PORCELAIN/CERAMIC: HCPCS

## 2024-06-17 PROCEDURE — LAB02 LAB FEE CROWN

## 2024-06-17 NOTE — PROGRESS NOTES
[place] Delivery #4    Ricky Fournier 34 y.o. male presents with self to [facility] for Zirconia [place] delivery #4  PMH reviewed, no changes, ASA I.   Pain level 0/10    Anesthesia:  Not indicated due to previous RCT.    Procedure details:  Throat screen placed. Provisional crown removed.  Cement removed with .  Cleaned prep with prophy brush on slow speed.   Crown tried on die, confirmed accurate seating, margin will adapted and sealed.  [place] tried intraorally and checked for marginal fit, occlusion, and contacts.  Adjusted occlusion and contacts as needed.  Verified seating with radiograph.  Confirmed satisfaction of fit and aesthetics with patient mirror. Pt understood this was the last opportunity to request changes if desired. Pt accepted the crown for cementation.  Isolation: Cotton rolls  Etched prep with 37% H2PO4 15 seconds.  Treated crown intaglio with Ivoclean and Sandblasting  Cemented with Calibra resin cement.  Tack cured 3 sec, excess cement removed with  and high speed suction.  After 3 min, floss through contacts to remove interproximal cement.  Remaining cement removed after 5 min final set.  Re-checked occlusion and contacts.     Patient dismissed ambulatory and alert    NV: #5 crown prep and final impression

## 2024-07-15 ENCOUNTER — OFFICE VISIT (OUTPATIENT)
Dept: DENTISTRY | Facility: CLINIC | Age: 34
End: 2024-07-15

## 2024-07-15 VITALS — DIASTOLIC BLOOD PRESSURE: 78 MMHG | HEART RATE: 85 BPM | SYSTOLIC BLOOD PRESSURE: 132 MMHG

## 2024-07-15 DIAGNOSIS — K02.9 CARIES: Primary | ICD-10-CM

## 2024-07-15 PROCEDURE — WIS2000 CROWN PREP

## 2024-07-15 NOTE — DENTAL PROCEDURE DETAILS
[place]/bridge Prep and Temp #5    Ricky Fournier 34 y.o. male presents with self to [facility] for Prep and Temp.  PMH reviewed, no changes, ASA I. Significant medical history: NA. Significant allergies: NKDA. Significant medications: none.    Diagnosis:   Endo-treated tooth in need of full coverage crown    Prognosis:  good    Consent:  Risks of specific procedure: Recurrent decay post final restoration.  Risks of any dental procedure: post procedural pain or sensitivity, local anesthetic side effects, allergic reaction to dental materials and medications, breakage of local anesthetic needle, aspiration of small dental tools, injury to nearby hard and soft tissues and anatomical structures.  Benefits: prevent further breakdown of tooth, fracture of tooth .  Alternatives: extraction, no tx.  Tx plan for crown/bridge #5 reviewed. Opportunity to ask questions given, all questions answered to degree of medical and dental certainty.  Patient understands and consent given by self via verbal consent.    Anesthesia:  1 carps 2% Lidocaine 1:100k epi via palatal/lingual infiltration.    Procedure details:  Preliminary stent for provisional crown/bridge created with blueprint.  Tooth #5 prepped with reduction for Zirconia.  Made provisional crown/bridge with provisa and matrix, refined with lizandro on high speed.  Confirmed provisional covers margins with no overhangs.  Margin isolation: Was not needed..  Cemented provisional crown/bridge using Provisa temporary cement. Removed excess cement, occlusion and contacts verified.   Patient dismissed ambulatory and alert.    NV: Final impression crown and shade #5.    Attending: Dr. Diaz checked #5 .

## 2024-07-17 ENCOUNTER — OFFICE VISIT (OUTPATIENT)
Dept: DENTISTRY | Facility: CLINIC | Age: 34
End: 2024-07-17

## 2024-07-17 VITALS — SYSTOLIC BLOOD PRESSURE: 131 MMHG | HEART RATE: 74 BPM | DIASTOLIC BLOOD PRESSURE: 84 MMHG

## 2024-07-17 DIAGNOSIS — K02.9 CARIES: Primary | ICD-10-CM

## 2024-07-17 PROCEDURE — WIS2001 FINAL IMPRESSION - CROWN OR IMPLANT

## 2024-07-17 NOTE — DENTAL PROCEDURE DETAILS
[place]/bridge Final impression #5    Ricky Fournier 34 y.o. male presents with self to [facility] for Temp and Final impression.  PMH reviewed, no changes, ASA I. Significant medical history: N/A. Significant allergies: NKDA. Significant medications: N/A.    Diagnosis:   Endo-treated tooth in need of full coverage crown    Prognosis:  good    Consent:  Risks of specific procedure: need for RCT if pulp exposure occurs or in future if pulp is inflamed, damage to adjacent tooth and/or restoration.  Risks of any dental procedure: post procedural pain or sensitivity, local anesthetic side effects, allergic reaction to dental materials and medications, breakage of local anesthetic needle, aspiration of small dental tools, injury to nearby hard and soft tissues and anatomical structures.  Benefits: prevent further breakdown of tooth and its sequelae.  Alternatives: no tx.  Tx plan for crown/bridge #5 reviewed. Opportunity to ask questions given, all questions answered to degree of medical and dental certainty.  Patient understands and consent given by self via verbal consent.    Anesthesia:  1 carps 4% Septocaine 1:100k epi via buccal infiltration and palatal/lingual infiltration.    Procedure details:  Preliminary stent for provisional crown/bridge created.  Tooth #5 prepped with reduction for Zirconia.  Made provisional crown/bridge with provisa and matrix, refined with lizandro on high speed.  Confirmed provisional covers margins with no overhangs.  Margin isolation: Packed two cords (size 0 and 1) soaked in hemodent. Removed 1 cord after 5 min for final impression, air dried. Removed other cord after successful final impression..  Final Impression made with [employer] scan.  Impression quality examined, confirmed prep(s) captured with no voids or distortions.  Cemented provisional crown/bridge using Provisa temporary cement. Removed excess cement, occlusion and contacts verified.   Selected porcelain shade: A2. Stump shade: A2.  "Pt confirmed with hand mirror.     After cementing the temporary crown, patient stated \"I dont know what happened but now my two teeth behind that tooth is hurting me a lot and I have a lot of anxiety about it. All I want to do is leave.\" Confirmed patient has no allergies. Dr. Solo examined cemented temporary crown and its occlusion and adjacent teeth. All was with in normal limited. Explained to patient if pain does not go away to return.     Patient dismissed.    NV: [place] delivery #5 with Dr. Huizar.    Attending:  Dr. Plunkett and Dr. Solo  examined pt.   "

## 2024-08-27 ENCOUNTER — OFFICE VISIT (OUTPATIENT)
Dept: DENTISTRY | Facility: CLINIC | Age: 34
End: 2024-08-27

## 2024-08-27 VITALS — DIASTOLIC BLOOD PRESSURE: 84 MMHG | HEART RATE: 81 BPM | SYSTOLIC BLOOD PRESSURE: 124 MMHG

## 2024-08-27 DIAGNOSIS — Z98.811 HISTORY OF ROOT CANAL TREATMENT: Primary | ICD-10-CM

## 2024-08-27 PROCEDURE — LAB02 LAB FEE CROWN

## 2024-08-27 PROCEDURE — D2740 CROWN - PORCELAIN/CERAMIC: HCPCS

## 2024-08-27 NOTE — DENTAL PROCEDURE DETAILS
[place]/bridge Delivery #5    Ricky Fournier 34 y.o. male presents with self to [facility] for crown/bridge delivery #5.  PMH reviewed, no changes, ASA I.    Anesthesia:  Not needed because tooth is endodontically treated.    Procedure details:  Provisional crown/bridge removed.  Cement residue removed with .  Crown/bridge tried on die, confirmed accurate seating, margin will adapted and sealed.  [place]/bridge tried intraorally and checked for marginal fit, occlusion, and contacts.  Adjusted occlusion and contacts as needed.  Verified seating with radiograph: Single BW - #5 .  Confirmed satisfaction of fit and aesthetics with patient mirror. Pt understood this was the last opportunity to request changes if desired. Pt accepted the crown/bridge for cementation.    Isolation: cotton rolls and high volume suction  Treated crown/bridge intaglio with Ivoclean.  Cemented with  rely-x .  Tack cured 3 sec, excess cement removed with  and high speed suction.  After 3 min, floss through contacts to remove interproximal cement.  Remaining cement removed after 5 min final set.  Re-checked occlusion and contacts.       After cementing the crown, patient occlusion was high. However, patient did not want me to adjust his occlusion as he was worried the finish of the crown was going to change. Dr. Ward came to provide patient support and was able to adjust occlusion. Patient stated occlusion was comfortable and was dismissed. Upon dismissal, patient stated to  the crown was bothering him. Patient returned and Dr. Ward and Dr. Diaz took a look and ensure patient was comfortable with occlusion when dismissed.     Patient dismissed ambulatory and alert    NV: continue fixed treatment    Attending: Dr. Diaz and Dr. Ward examined pt and adjusted occlusion.

## 2025-03-12 ENCOUNTER — TELEPHONE (OUTPATIENT)
Dept: FAMILY MEDICINE CLINIC | Facility: CLINIC | Age: 35
End: 2025-03-12